# Patient Record
Sex: MALE | URBAN - METROPOLITAN AREA
[De-identification: names, ages, dates, MRNs, and addresses within clinical notes are randomized per-mention and may not be internally consistent; named-entity substitution may affect disease eponyms.]

---

## 2018-08-01 ENCOUNTER — APPOINTMENT (RX ONLY)
Dept: URBAN - METROPOLITAN AREA CLINIC 95 | Facility: CLINIC | Age: 58
Setting detail: DERMATOLOGY
End: 2018-08-01

## 2018-08-01 DIAGNOSIS — L60.3 NAIL DYSTROPHY: ICD-10-CM

## 2018-08-01 PROCEDURE — ? TREATMENT REGIMEN

## 2018-08-01 PROCEDURE — ? ADDITIONAL NOTES

## 2018-08-01 PROCEDURE — ? COUNSELING

## 2018-08-01 PROCEDURE — 99202 OFFICE O/P NEW SF 15 MIN: CPT

## 2018-08-01 NOTE — PROCEDURE: ADDITIONAL NOTES
Additional Notes: I discussed with patient that his grooming technique may have lead to the nail changes.  Approximately 2mm of normal growth proximal nail bed.  \\n\\nI offered topical anti fungal to help prevent infection.  Patient declined.  Advised 1:1 ratio of H20 and white distilled vinegar soaks to help prevent infection.  Discussed with patient that dystrophic nail would take approximately 6+ months to grow out from present location.

## 2018-08-01 NOTE — PROCEDURE: TREATMENT REGIMEN
Detail Level: Zone
Plan: Discussed vinegar soaks. Patient instructed to use 1 part vinegar and 1 part water. \\nDiscussed tea tree oil\\nPatient declined topical treatments